# Patient Record
Sex: MALE | Race: WHITE | NOT HISPANIC OR LATINO | URBAN - METROPOLITAN AREA
[De-identification: names, ages, dates, MRNs, and addresses within clinical notes are randomized per-mention and may not be internally consistent; named-entity substitution may affect disease eponyms.]

---

## 2020-03-24 ENCOUNTER — EMERGENCY (EMERGENCY)
Facility: HOSPITAL | Age: 65
LOS: 1 days | Discharge: HOME | End: 2020-03-24
Attending: EMERGENCY MEDICINE
Payer: SELF-PAY

## 2020-03-24 VITALS
SYSTOLIC BLOOD PRESSURE: 140 MMHG | DIASTOLIC BLOOD PRESSURE: 92 MMHG | HEART RATE: 85 BPM | OXYGEN SATURATION: 95 % | HEIGHT: 71 IN | TEMPERATURE: 99 F | WEIGHT: 218.04 LBS | RESPIRATION RATE: 20 BRPM

## 2020-03-24 PROCEDURE — 99284 EMERGENCY DEPT VISIT MOD MDM: CPT | Mod: 25

## 2020-03-24 PROCEDURE — 99284 EMERGENCY DEPT VISIT MOD MDM: CPT

## 2020-03-24 PROCEDURE — 71046 X-RAY EXAM CHEST 2 VIEWS: CPT | Mod: 26

## 2020-03-24 PROCEDURE — 70360 X-RAY EXAM OF NECK: CPT | Mod: 26

## 2020-03-24 PROCEDURE — 71046 X-RAY EXAM CHEST 2 VIEWS: CPT

## 2020-03-24 PROCEDURE — 70360 X-RAY EXAM OF NECK: CPT

## 2020-03-24 NOTE — ED ADULT NURSE NOTE - OBJECTIVE STATEMENT
Pt s/p swallowed entire toothbrush trying to get piece of meat stuck in throat x 1 hr ago. No acute distress noted, denies chest pain, no shortness of breath indicated. Safety maintained. Pt s/p swallowed entire toothbrush trying to get piece of meat stuck in throat x 1 hr ago. Pt denies suicidal /homicidal ideations. No acute distress noted, denies chest pain, no shortness of breath indicated. Safety maintained.

## 2020-03-24 NOTE — ED PROVIDER NOTE - PROGRESS NOTE DETAILS
foreign body visualized on xray.  no endocipy suite available here at [facility] as it was converted for COVID management.  Transfer center contacted.  Spoke with GI fellow Emma.  [facility] also does not have endoscopy suite for same reason.  Since procedure can not be done till AM at either facility, GI here at [facility], Dr. Brantley, comfortable to do procedure in AM.  patient does not want to wait overnight and says he will follow up as outpatient.  pt speaking full sentences, tolerating secretions, no signs of impaction.  Will d/c

## 2020-03-24 NOTE — ED ADULT NURSE NOTE - JUGULAR VENOUS DISTENTION
Returned call to home care nurse to give verbal orders per protocol as requested. Nurse verbalized understanding.    Shaniqua Blair RN      
[facility] phone call message - order or referral request for patient:     Order or referral being requested: Requesting Updated Order  Nursing/ nursing visit  Changed from early part of the week to middle of the week. This is due to a conflict with [employer] holiday.      Additional Comments: none    OK to leave a message on voice mail? Yes    Primary language: Central African      needed? Yes    Call taken on August 20, 2018 at 2:00 PM by Diane Gil      
absent

## 2020-03-24 NOTE — ED ADULT NURSE NOTE - ED STAT RN HANDOFF DETAILS
Report given to KEO Sequeira. Pt sitting in chair, no acute distress noted, denies chest pain noted, no shortness of breath indicated. Safety maintained

## 2020-03-24 NOTE — ED PROVIDER NOTE - OBJECTIVE STATEMENT
63 y/o M with a significant PMHx of HTN presents to the ER c/o foreign body in throat. Patient states he was eating, and felt something stuck in his throat. Patient states he used his toothbrush to dislodge what was stuck. Patient states he swallowed the toothbrush and notes that he feels it is stuck in between his chest and throat. Patient denies any other complaints.

## 2020-03-24 NOTE — ED PROVIDER NOTE - PATIENT PORTAL LINK FT
You can access the FollowMyHealth Patient Portal offered by [facility] by registering at the following website: [email or web address]. By joining [facility]’s FollowMyHealth portal, you will also be able to view your health information using other applications (apps) compatible with our system.

## 2020-03-25 VITALS
SYSTOLIC BLOOD PRESSURE: 143 MMHG | RESPIRATION RATE: 18 BRPM | DIASTOLIC BLOOD PRESSURE: 80 MMHG | OXYGEN SATURATION: 97 % | TEMPERATURE: 98 F | HEART RATE: 89 BPM

## 2021-09-29 ENCOUNTER — EMERGENCY (EMERGENCY)
Facility: HOSPITAL | Age: 66
LOS: 1 days | Discharge: HOME | End: 2021-09-29
Attending: STUDENT IN AN ORGANIZED HEALTH CARE EDUCATION/TRAINING PROGRAM
Payer: COMMERCIAL

## 2021-09-29 VITALS
DIASTOLIC BLOOD PRESSURE: 83 MMHG | RESPIRATION RATE: 17 BRPM | SYSTOLIC BLOOD PRESSURE: 125 MMHG | HEART RATE: 65 BPM | HEIGHT: 71 IN | OXYGEN SATURATION: 97 % | TEMPERATURE: 97 F | WEIGHT: 184.97 LBS

## 2021-09-29 PROCEDURE — 99285 EMERGENCY DEPT VISIT HI MDM: CPT

## 2021-09-29 RX ORDER — TETANUS TOXOID, REDUCED DIPHTHERIA TOXOID AND ACELLULAR PERTUSSIS VACCINE, ADSORBED 5; 2.5; 8; 8; 2.5 [IU]/.5ML; [IU]/.5ML; UG/.5ML; UG/.5ML; UG/.5ML
0.5 SUSPENSION INTRAMUSCULAR ONCE
Refills: 0 | Status: COMPLETED | OUTPATIENT
Start: 2021-09-29 | End: 2021-09-29

## 2021-09-29 RX ADMIN — TETANUS TOXOID, REDUCED DIPHTHERIA TOXOID AND ACELLULAR PERTUSSIS VACCINE, ADSORBED 0.5 MILLILITER(S): 5; 2.5; 8; 8; 2.5 SUSPENSION INTRAMUSCULAR at 23:31

## 2021-09-29 NOTE — ED PROVIDER NOTE - NSFOLLOWUPCLINICS_GEN_ALL_ED_FT
[facility]  Cardiology  [address] [place] [id number]  Phone: [phone]  Fax:   Follow Up Time: Urgent

## 2021-09-29 NOTE — ED PROVIDER NOTE - PHYSICAL EXAMINATION
Vital Signs Reviewed  GEN: Comfortable, Etoh odor, NAD  HEENT: NCAT, No cspine TTP, MMM, Neck Supple  RESP: CTAB, No rales/rhonchi/wheezing  CV: RRR, S1S2, No murmurs  ABD: No TTP, Soft, ND, No masses, No CVA Tenderness  Extrem/Skin: Small superficial abrasion to R forearm with minimal swelling and no lac/bony TTP/deformity, No other signs of trauma, Equal pulses bilat, No cyanosis/edema/rashes  Neuro: No focal deficits

## 2021-09-29 NOTE — ED PROVIDER NOTE - CLINICAL SUMMARY MEDICAL DECISION MAKING FREE TEXT BOX
Pt p/w AMS stating that he was drinking etoh and only wants to sleep. Reassuring exam with only small R forearm abrasion and no other signs of trauma. Updating tetanus and will observe for clinical sobriety. Pt stable. Will reassess. Pt p/w AMS stating that he was drinking etoh and only wants to sleep. Reassuring exam with only small R forearm abrasion and no other signs of trauma. Updating tetanus and will observe for clinical sobriety. Pt stable. Will reassess.    See progress note.    Pt demanding immediate d/c despite atrial fibrillation and very low K. Pt is fully oriented and conversational with steady gait, will not deny his decisional capacity for these reasons. Rec cardiology and PMD f/u ASAP. Discussed with pt my clinical impression and results, patient given strict return precautions if persistent or worsening of symptoms occurs, and need for close follow up. Pt expressed understanding and agrees with plan. Pt is well appearing with a reassuring exam. Discharge home with PMD or Specialist f/u within 3 days.

## 2021-09-29 NOTE — ED PROVIDER NOTE - PATIENT PORTAL LINK FT
You can access the FollowMyHealth Patient Portal offered by [facility] by registering at the following website: [email or web address]. By joining [facility]’s FollowMyHealth portal, you will also be able to view your health information using other applications (apps) compatible with our system.

## 2021-09-29 NOTE — ED PROVIDER NOTE - NSFOLLOWUPINSTRUCTIONS_ED_ALL_ED_FT
You were seen in the emergency room today for alcohol intoxication. While in the ER you were found to have Atrial Fibrillation and should obtain the next available appointment with the Cardiology doctors. Please call your primary doctor to inform them of this ER visit and obtain the next available appointment within the next 5 days. As we discussed, return to the ER if you have any worsening symptoms.    We no longer feel that you need further emergency care or admission to the hospital at this time.    While we have determined that you are currently stable for discharge, we know that things can change. Please seek immediate medical attention or return to the ER if you experience any of the following:  Any worsening or persistent symptoms  Severe Pain  Chest Pain  Difficulty Breathing  Bleeding  Passing Out  Severe Rash  Inability to Eat or Drink  Persistent Fever    Please see a primary care doctor or specialist within 5 days to ensure that you are improving.    Please call the [facility] phone numbers on this document if you have any problems obtaining a follow up appointment.    I wish you well! -Dr Lujan

## 2021-09-29 NOTE — ED PROVIDER NOTE - PROGRESS NOTE DETAILS
Pt noted to be tachycardic, EKG obtained showing atrial fib with RVR. Pt given diltiazem for rate control as hx is unknown. Rate control achieved with HR in 110s. On reassessment pt states that he would like to leave immediately. D/w pt the new diagnosis of afib however pt states that he does not want to stay in a hospital any longer and requesting d/c immediately. Pt is ambulating with a steady gait with clear speech, cannot deny pt's decisional capacity at this time. See dispo note.

## 2021-09-29 NOTE — ED PROVIDER NOTE - OBJECTIVE STATEMENT
65 yo M h/o HTN p/w etoh intox. Pt found on street by EMS after unknown person called 911. Pt states that he was drinking etoh tonight and "wants to sleep." Pt denies any trauma/injuries, drugs today, seizure, chest pain, SOB, other recent illness or hospitalizations.

## 2021-09-30 VITALS — DIASTOLIC BLOOD PRESSURE: 80 MMHG | HEART RATE: 113 BPM | SYSTOLIC BLOOD PRESSURE: 132 MMHG

## 2021-09-30 PROBLEM — I10 ESSENTIAL (PRIMARY) HYPERTENSION: Chronic | Status: ACTIVE | Noted: 2020-03-25

## 2021-09-30 LAB
ALBUMIN SERPL ELPH-MCNC: 3.4 G/DL — LOW (ref 3.5–5)
ALP SERPL-CCNC: 80 U/L — SIGNIFICANT CHANGE UP (ref 40–120)
ALT FLD-CCNC: 113 U/L DA — HIGH (ref 10–60)
ANION GAP SERPL CALC-SCNC: 13 MMOL/L — SIGNIFICANT CHANGE UP (ref 5–17)
ANISOCYTOSIS BLD QL: SLIGHT — SIGNIFICANT CHANGE UP
AST SERPL-CCNC: 186 U/L — HIGH (ref 10–40)
BASOPHILS # BLD AUTO: 0.03 K/UL — SIGNIFICANT CHANGE UP (ref 0–0.2)
BASOPHILS NFR BLD AUTO: 0.5 % — SIGNIFICANT CHANGE UP (ref 0–2)
BILIRUB SERPL-MCNC: 0.8 MG/DL — SIGNIFICANT CHANGE UP (ref 0.2–1.2)
BUN SERPL-MCNC: 22 MG/DL — HIGH (ref 7–18)
CALCIUM SERPL-MCNC: 8.6 MG/DL — SIGNIFICANT CHANGE UP (ref 8.4–10.5)
CHLORIDE SERPL-SCNC: 88 MMOL/L — LOW (ref 96–108)
CO2 SERPL-SCNC: 35 MMOL/L — HIGH (ref 22–31)
CREAT SERPL-MCNC: 1.59 MG/DL — HIGH (ref 0.5–1.3)
EOSINOPHIL # BLD AUTO: 0.01 K/UL — SIGNIFICANT CHANGE UP (ref 0–0.5)
EOSINOPHIL NFR BLD AUTO: 0.2 % — SIGNIFICANT CHANGE UP (ref 0–6)
ETHANOL SERPL-MCNC: 203 MG/DL — HIGH (ref 0–10)
GLUCOSE SERPL-MCNC: 107 MG/DL — HIGH (ref 70–99)
HCT VFR BLD CALC: 46 % — SIGNIFICANT CHANGE UP (ref 39–50)
HGB BLD-MCNC: 14.9 G/DL — SIGNIFICANT CHANGE UP (ref 13–17)
IMM GRANULOCYTES NFR BLD AUTO: 0.5 % — SIGNIFICANT CHANGE UP (ref 0–1.5)
LYMPHOCYTES # BLD AUTO: 1.27 K/UL — SIGNIFICANT CHANGE UP (ref 1–3.3)
LYMPHOCYTES # BLD AUTO: 19.8 % — SIGNIFICANT CHANGE UP (ref 13–44)
MACROCYTES BLD QL: SLIGHT — SIGNIFICANT CHANGE UP
MANUAL SMEAR VERIFICATION: SIGNIFICANT CHANGE UP
MCHC RBC-ENTMCNC: 26.5 PG — LOW (ref 27–34)
MCHC RBC-ENTMCNC: 32.4 GM/DL — SIGNIFICANT CHANGE UP (ref 32–36)
MCV RBC AUTO: 81.9 FL — SIGNIFICANT CHANGE UP (ref 80–100)
MONOCYTES # BLD AUTO: 0.83 K/UL — SIGNIFICANT CHANGE UP (ref 0–0.9)
MONOCYTES NFR BLD AUTO: 13 % — SIGNIFICANT CHANGE UP (ref 2–14)
NEUTROPHILS # BLD AUTO: 4.23 K/UL — SIGNIFICANT CHANGE UP (ref 1.8–7.4)
NEUTROPHILS NFR BLD AUTO: 66 % — SIGNIFICANT CHANGE UP (ref 43–77)
NRBC # BLD: 0 /100 WBCS — SIGNIFICANT CHANGE UP (ref 0–0)
PLAT MORPH BLD: NORMAL — SIGNIFICANT CHANGE UP
PLATELET # BLD AUTO: 213 K/UL — SIGNIFICANT CHANGE UP (ref 150–400)
POLYCHROMASIA BLD QL SMEAR: SLIGHT — SIGNIFICANT CHANGE UP
POTASSIUM SERPL-MCNC: 2.7 MMOL/L — CRITICAL LOW (ref 3.5–5.3)
POTASSIUM SERPL-SCNC: 2.7 MMOL/L — CRITICAL LOW (ref 3.5–5.3)
PROT SERPL-MCNC: 7 G/DL — SIGNIFICANT CHANGE UP (ref 6–8.3)
RBC # BLD: 5.62 M/UL — SIGNIFICANT CHANGE UP (ref 4.2–5.8)
RBC # FLD: 21.4 % — HIGH (ref 10.3–14.5)
RBC BLD AUTO: ABNORMAL
SODIUM SERPL-SCNC: 136 MMOL/L — SIGNIFICANT CHANGE UP (ref 135–145)
TARGETS BLD QL SMEAR: SLIGHT — SIGNIFICANT CHANGE UP
TROPONIN I SERPL-MCNC: 0.04 NG/ML — SIGNIFICANT CHANGE UP (ref 0–0.04)
WBC # BLD: 6.4 K/UL — SIGNIFICANT CHANGE UP (ref 3.8–10.5)
WBC # FLD AUTO: 6.4 K/UL — SIGNIFICANT CHANGE UP (ref 3.8–10.5)

## 2021-09-30 PROCEDURE — 90715 TDAP VACCINE 7 YRS/> IM: CPT

## 2021-09-30 PROCEDURE — 96374 THER/PROPH/DIAG INJ IV PUSH: CPT

## 2021-09-30 PROCEDURE — 80307 DRUG TEST PRSMV CHEM ANLYZR: CPT

## 2021-09-30 PROCEDURE — 93005 ELECTROCARDIOGRAM TRACING: CPT

## 2021-09-30 PROCEDURE — 93010 ELECTROCARDIOGRAM REPORT: CPT

## 2021-09-30 PROCEDURE — 99285 EMERGENCY DEPT VISIT HI MDM: CPT | Mod: 25

## 2021-09-30 PROCEDURE — 85025 COMPLETE CBC W/AUTO DIFF WBC: CPT

## 2021-09-30 PROCEDURE — 84484 ASSAY OF TROPONIN QUANT: CPT

## 2021-09-30 PROCEDURE — 90471 IMMUNIZATION ADMIN: CPT

## 2021-09-30 PROCEDURE — 36415 COLL VENOUS BLD VENIPUNCTURE: CPT

## 2021-09-30 PROCEDURE — 80053 COMPREHEN METABOLIC PANEL: CPT

## 2021-09-30 PROCEDURE — 82962 GLUCOSE BLOOD TEST: CPT

## 2021-09-30 RX ORDER — SODIUM CHLORIDE 9 MG/ML
2000 INJECTION INTRAMUSCULAR; INTRAVENOUS; SUBCUTANEOUS ONCE
Refills: 0 | Status: COMPLETED | OUTPATIENT
Start: 2021-09-30 | End: 2021-09-30

## 2021-09-30 RX ORDER — DILTIAZEM HCL 120 MG
15 CAPSULE, EXT RELEASE 24 HR ORAL ONCE
Refills: 0 | Status: DISCONTINUED | OUTPATIENT
Start: 2021-09-30 | End: 2021-09-30

## 2021-09-30 RX ORDER — POTASSIUM CHLORIDE 20 MEQ
10 PACKET (EA) ORAL
Refills: 0 | Status: COMPLETED | OUTPATIENT
Start: 2021-09-30 | End: 2021-09-30

## 2021-09-30 RX ORDER — SODIUM CHLORIDE 9 MG/ML
3 INJECTION INTRAMUSCULAR; INTRAVENOUS; SUBCUTANEOUS EVERY 8 HOURS
Refills: 0 | Status: DISCONTINUED | OUTPATIENT
Start: 2021-09-30 | End: 2021-10-03

## 2021-09-30 RX ORDER — POTASSIUM CHLORIDE 20 MEQ
40 PACKET (EA) ORAL ONCE
Refills: 0 | Status: COMPLETED | OUTPATIENT
Start: 2021-09-30 | End: 2021-09-30

## 2021-09-30 RX ORDER — DILTIAZEM HCL 120 MG
20 CAPSULE, EXT RELEASE 24 HR ORAL ONCE
Refills: 0 | Status: COMPLETED | OUTPATIENT
Start: 2021-09-30 | End: 2021-09-30

## 2021-09-30 RX ORDER — DILTIAZEM HCL 120 MG
60 CAPSULE, EXT RELEASE 24 HR ORAL ONCE
Refills: 0 | Status: COMPLETED | OUTPATIENT
Start: 2021-09-30 | End: 2021-09-30

## 2021-09-30 RX ADMIN — Medication 60 MILLIGRAM(S): at 05:06

## 2021-09-30 RX ADMIN — Medication 100 MILLIEQUIVALENT(S): at 05:05

## 2021-09-30 RX ADMIN — Medication 20 MILLIGRAM(S): at 05:14

## 2021-09-30 RX ADMIN — SODIUM CHLORIDE 2000 MILLILITER(S): 9 INJECTION INTRAMUSCULAR; INTRAVENOUS; SUBCUTANEOUS at 04:12

## 2021-09-30 RX ADMIN — Medication 100 MILLIEQUIVALENT(S): at 06:07

## 2021-09-30 RX ADMIN — Medication 40 MILLIEQUIVALENT(S): at 05:05

## 2022-05-20 ENCOUNTER — EMERGENCY (EMERGENCY)
Facility: HOSPITAL | Age: 67
LOS: 1 days | Discharge: HOME | End: 2022-05-20
Attending: STUDENT IN AN ORGANIZED HEALTH CARE EDUCATION/TRAINING PROGRAM
Payer: COMMERCIAL

## 2022-05-20 VITALS
SYSTOLIC BLOOD PRESSURE: 148 MMHG | OXYGEN SATURATION: 96 % | TEMPERATURE: 99 F | DIASTOLIC BLOOD PRESSURE: 95 MMHG | HEART RATE: 75 BPM | HEIGHT: 71 IN | WEIGHT: 210.1 LBS | RESPIRATION RATE: 20 BRPM

## 2022-05-20 LAB
AMPHET UR-MCNC: NEGATIVE — SIGNIFICANT CHANGE UP
ANION GAP SERPL CALC-SCNC: 14 MMOL/L — SIGNIFICANT CHANGE UP (ref 5–17)
APAP SERPL-MCNC: <3 UG/ML — LOW (ref 10–26)
APPEARANCE UR: CLEAR — SIGNIFICANT CHANGE UP
BARBITURATES UR SCN-MCNC: NEGATIVE — SIGNIFICANT CHANGE UP
BASOPHILS # BLD AUTO: 0.03 K/UL — SIGNIFICANT CHANGE UP (ref 0–0.2)
BASOPHILS NFR BLD AUTO: 0.5 % — SIGNIFICANT CHANGE UP (ref 0–2)
BENZODIAZ UR-MCNC: NEGATIVE — SIGNIFICANT CHANGE UP
BILIRUB UR-MCNC: NEGATIVE — SIGNIFICANT CHANGE UP
BUN SERPL-MCNC: 23.5 MG/DL — HIGH (ref 8–20)
CALCIUM SERPL-MCNC: 9 MG/DL — SIGNIFICANT CHANGE UP (ref 8.6–10.2)
CHLORIDE SERPL-SCNC: 100 MMOL/L — SIGNIFICANT CHANGE UP (ref 98–107)
CO2 SERPL-SCNC: 25 MMOL/L — SIGNIFICANT CHANGE UP (ref 22–29)
COCAINE METAB.OTHER UR-MCNC: NEGATIVE — SIGNIFICANT CHANGE UP
COLOR SPEC: YELLOW — SIGNIFICANT CHANGE UP
CREAT SERPL-MCNC: 1.12 MG/DL — SIGNIFICANT CHANGE UP (ref 0.5–1.3)
DIFF PNL FLD: NEGATIVE — SIGNIFICANT CHANGE UP
EGFR: 72 ML/MIN/1.73M2 — SIGNIFICANT CHANGE UP
EOSINOPHIL # BLD AUTO: 0.12 K/UL — SIGNIFICANT CHANGE UP (ref 0–0.5)
EOSINOPHIL NFR BLD AUTO: 2.2 % — SIGNIFICANT CHANGE UP (ref 0–6)
ETHANOL SERPL-MCNC: <10 MG/DL — SIGNIFICANT CHANGE UP (ref 0–9)
GLUCOSE SERPL-MCNC: 100 MG/DL — HIGH (ref 70–99)
GLUCOSE UR QL: NEGATIVE MG/DL — SIGNIFICANT CHANGE UP
HCT VFR BLD CALC: 39.6 % — SIGNIFICANT CHANGE UP (ref 39–50)
HGB BLD-MCNC: 13 G/DL — SIGNIFICANT CHANGE UP (ref 13–17)
IMM GRANULOCYTES NFR BLD AUTO: 0.4 % — SIGNIFICANT CHANGE UP (ref 0–1.5)
KETONES UR-MCNC: NEGATIVE — SIGNIFICANT CHANGE UP
LEUKOCYTE ESTERASE UR-ACNC: NEGATIVE — SIGNIFICANT CHANGE UP
LYMPHOCYTES # BLD AUTO: 0.89 K/UL — LOW (ref 1–3.3)
LYMPHOCYTES # BLD AUTO: 16.3 % — SIGNIFICANT CHANGE UP (ref 13–44)
MCHC RBC-ENTMCNC: 29.6 PG — SIGNIFICANT CHANGE UP (ref 27–34)
MCHC RBC-ENTMCNC: 32.8 GM/DL — SIGNIFICANT CHANGE UP (ref 32–36)
MCV RBC AUTO: 90.2 FL — SIGNIFICANT CHANGE UP (ref 80–100)
METHADONE UR-MCNC: NEGATIVE — SIGNIFICANT CHANGE UP
MONOCYTES # BLD AUTO: 0.56 K/UL — SIGNIFICANT CHANGE UP (ref 0–0.9)
MONOCYTES NFR BLD AUTO: 10.2 % — SIGNIFICANT CHANGE UP (ref 2–14)
NEUTROPHILS # BLD AUTO: 3.85 K/UL — SIGNIFICANT CHANGE UP (ref 1.8–7.4)
NEUTROPHILS NFR BLD AUTO: 70.4 % — SIGNIFICANT CHANGE UP (ref 43–77)
NITRITE UR-MCNC: NEGATIVE — SIGNIFICANT CHANGE UP
OPIATES UR-MCNC: NEGATIVE — SIGNIFICANT CHANGE UP
PCP SPEC-MCNC: SIGNIFICANT CHANGE UP
PCP UR-MCNC: NEGATIVE — SIGNIFICANT CHANGE UP
PH UR: 7 — SIGNIFICANT CHANGE UP (ref 5–8)
PLATELET # BLD AUTO: 171 K/UL — SIGNIFICANT CHANGE UP (ref 150–400)
POTASSIUM SERPL-MCNC: 4.1 MMOL/L — SIGNIFICANT CHANGE UP (ref 3.5–5.3)
POTASSIUM SERPL-SCNC: 4.1 MMOL/L — SIGNIFICANT CHANGE UP (ref 3.5–5.3)
PROT UR-MCNC: NEGATIVE — SIGNIFICANT CHANGE UP
RBC # BLD: 4.39 M/UL — SIGNIFICANT CHANGE UP (ref 4.2–5.8)
RBC # FLD: 14.5 % — SIGNIFICANT CHANGE UP (ref 10.3–14.5)
SALICYLATES SERPL-MCNC: <0.6 MG/DL — LOW (ref 10–20)
SARS-COV-2 RNA SPEC QL NAA+PROBE: SIGNIFICANT CHANGE UP
SODIUM SERPL-SCNC: 139 MMOL/L — SIGNIFICANT CHANGE UP (ref 135–145)
SP GR SPEC: 1.01 — SIGNIFICANT CHANGE UP (ref 1.01–1.02)
THC UR QL: NEGATIVE — SIGNIFICANT CHANGE UP
TSH SERPL-MCNC: 2.71 UIU/ML — SIGNIFICANT CHANGE UP (ref 0.27–4.2)
UROBILINOGEN FLD QL: NEGATIVE MG/DL — SIGNIFICANT CHANGE UP
WBC # BLD: 5.47 K/UL — SIGNIFICANT CHANGE UP (ref 3.8–10.5)
WBC # FLD AUTO: 5.47 K/UL — SIGNIFICANT CHANGE UP (ref 3.8–10.5)

## 2022-05-20 PROCEDURE — 99285 EMERGENCY DEPT VISIT HI MDM: CPT

## 2022-05-20 PROCEDURE — 70450 CT HEAD/BRAIN W/O DYE: CPT | Mod: 26,MD

## 2022-05-20 PROCEDURE — 93010 ELECTROCARDIOGRAM REPORT: CPT

## 2022-05-20 NOTE — ED PROVIDER NOTE - CLINICAL SUMMARY MEDICAL DECISION MAKING FREE TEXT BOX
From [facility] for alcohol, presents with hallucinations, abnormal from usual. Will send labs, CT head, psych evaluate.

## 2022-05-20 NOTE — ED ADULT TRIAGE NOTE - CHIEF COMPLAINT QUOTE
presenting from [facility] where staff states he is hallucinating, seeing and hearing people who are not there. in triage patient is A&Ox4, calm and cooperative, denies any SI/HI

## 2022-05-20 NOTE — ED PROVIDER NOTE - OBJECTIVE STATEMENT
66 y/o male with a PMHx of alcohol abuse, depression, on Ativan, presents from [facility] for alcohol c/o hallucinations today. [facility] employee states he has been seeing people in his room. Pt has been at [facility] before, employees are familiar with him and state he is acting abnormally. Last alcohol use a few days ago. Denies SI/HI. Pt states his daughter recently committed suicide, and her birthday is coming up, so at times he feels depressed. Denies chest pain, SOB, abdominal pain, or n/v/d. Denies any other complaints. 68 y/o male with a PMHx of depression, on Ativan, presents from [facility] for alcohol c/o hallucinations today. [facility] employee states he has been hallucinating people in his room. Pt has been at [facility] before, employees are familiar with him and state he is acting abnormally. Last alcohol use a few days ago. Denies SI/HI. Pt states his daughter recently committed suicide and her birthday is coming up, so at times he feels depressed. Denies chest pain, SOB, abdominal pain, or n/v/d. Denies any other complaints. 66 y/o male with a PMHx of depression, on Ativan, presents from [facility] for alcohol c/o hallucinations today. [facility] employee states he has been hallucinating people in his room. Pt has been at [facility] before, employees are familiar with him and state he is acting abnormally. Reports instances where he is trying to drink water with the cap still on. Last alcohol use a few days ago. Pt states his daughter recently committed suicide and her birthday is coming up, so at times he feels depressed. Denies SI/HI. Denies chest pain, SOB, abdominal pain, or n/v/d. Denies any other complaints.

## 2022-05-20 NOTE — ED PROVIDER NOTE - PROGRESS NOTE DETAILS
pending [facility] for psych eval seen by psych. cleared for dc. hallucinations thought to be due to alcohol withdrawal. [facility]  no tachycardia or signs of tremor. center to set up transport to return

## 2022-05-21 VITALS
RESPIRATION RATE: 20 BRPM | OXYGEN SATURATION: 98 % | HEART RATE: 68 BPM | TEMPERATURE: 98 F | DIASTOLIC BLOOD PRESSURE: 91 MMHG | SYSTOLIC BLOOD PRESSURE: 138 MMHG

## 2022-05-21 DIAGNOSIS — F43.21 ADJUSTMENT DISORDER WITH DEPRESSED MOOD: ICD-10-CM

## 2022-05-21 DIAGNOSIS — F10.20 ALCOHOL DEPENDENCE, UNCOMPLICATED: ICD-10-CM

## 2022-05-21 PROCEDURE — U0005: CPT

## 2022-05-21 PROCEDURE — 80048 BASIC METABOLIC PNL TOTAL CA: CPT

## 2022-05-21 PROCEDURE — 84443 ASSAY THYROID STIM HORMONE: CPT

## 2022-05-21 PROCEDURE — 99285 EMERGENCY DEPT VISIT HI MDM: CPT | Mod: 25

## 2022-05-21 PROCEDURE — 85025 COMPLETE CBC W/AUTO DIFF WBC: CPT

## 2022-05-21 PROCEDURE — 70450 CT HEAD/BRAIN W/O DYE: CPT | Mod: MD

## 2022-05-21 PROCEDURE — 36415 COLL VENOUS BLD VENIPUNCTURE: CPT

## 2022-05-21 PROCEDURE — 80307 DRUG TEST PRSMV CHEM ANLYZR: CPT

## 2022-05-21 PROCEDURE — U0003: CPT

## 2022-05-21 PROCEDURE — 81003 URINALYSIS AUTO W/O SCOPE: CPT

## 2022-05-21 PROCEDURE — 93005 ELECTROCARDIOGRAM TRACING: CPT

## 2022-05-21 RX ADMIN — Medication 1 MILLIGRAM(S): at 03:41

## 2022-05-21 NOTE — ED BEHAVIORAL HEALTH ASSESSMENT NOTE - SUMMARY
67y/oM, , domiciled w/ wife, works as  w/ PMH of Afib, GERD and PPhx of ETOH use Do BIBems activated by substance facility for concern for AH. On interview and exam, patient denies auditory or visual hallucinations and does not appear to be internally stimulated. Given recent ETOH use, presentation is most consistent with alcohol withdrawal vs. delirium vs. alcoholic hallucinosis given collateral from [facility] that patient endorsed AH. There is some concern, per treatment center, that at baseline patient seems confused at times, however it is difficult to assess and appreciate discrete neurocognitive disorder given acute concerns for withdrawal. At this time, patient does not require inpatient psychiatric admission as he denies any imminent safety concerns. Patient will benefit from establishing with addiction psychiatry as outpatient.

## 2022-05-21 NOTE — ED BEHAVIORAL HEALTH ASSESSMENT NOTE - CURRENT MEDICATION
Trazadone 200mg QHS, Seroquel 200mg QHS PRN, Buspar (dose unknown), propranolol (dose unknown), cymbalta 60mg BID

## 2022-05-21 NOTE — ED BEHAVIORAL HEALTH ASSESSMENT NOTE - DETAILS
Discussed with [facility] denies as above daughter and paternal uncle both completed suicide Topical Sulfur Applications Pregnancy And Lactation Text: This medication is Pregnancy Category C and has an unknown safety profile during pregnancy. It is unknown if this topical medication is excreted in breast milk. Not at acute or chronic risk for SA

## 2022-05-21 NOTE — ED BEHAVIORAL HEALTH ASSESSMENT NOTE - RISK ASSESSMENT
Low Acute Suicide Risk Risk factors for suicide attempt include substance use and fhx of completed suicide.   Protective factors include responsibility to family

## 2022-05-21 NOTE — ED BEHAVIORAL HEALTH NOTE - BEHAVIORAL HEALTH NOTE
Consult requested by EM Attending Dr. Walsh at 0:31. Telepsychiatry attempted to consult at 1:30 but unable to initiate due to delay in patient being set up on the telepsychiatry monitor. ED staff aware.

## 2022-05-21 NOTE — ED BEHAVIORAL HEALTH NOTE - BEHAVIORAL HEALTH NOTE
==================  PRE-HOSPITAL COURSE  ===================  SOURCE:  RN and secondhand ED documentation.  DETAILS:  Per chart, patient was BIB EMS activated by staff at [facility], reporting having non-command AH and VH.     ============  ED COURSE  =============  SOURCE:  RN and secondhand ED documentation.  ARRIVAL:  Per chart and RN, patient arrived via EMS, no triage issues noted.  BEHAVIOR: RN described patient to be currently calm and cooperative, presenting with disorganized thought process, not fully oriented, appears to be confused, presenting with stable mood and appropriate affect, remains in good behavioral and impulse control, is not currently violent/aggressive. RN stated that the pt was walking around and required constant verbal re-direction. RN stated that the patient is denying current SI/HI, report AVH that are non-command, unclear on what the content of the hallucinations are. RN stated that there are no visible marks, bruises, or lacerations on the body. RN stated that the patient appears to be fairly-groomed.  TREATMENT:  Per chart and RN, patient did not require PRN medications.   VISITORS:  None     ========================  FOR EACH COLLATERAL   ========================  NAME: Kerri Shirley RN   NUMBER: [phone]  RELATIONSHIP: Overnight Nurse Supervisor  RELIABILITY: Limited  Collateral has requested that the information provided remain confidential: Yes [  ] No [ x ]  Collateral has provided information that patient is/may be unaware of: Yes [  ] No [ x ]     Patient is a 67M currently domiciled with wife, employed in real estate, PPHx/o EtOH use, no no reported PMHx, no past IPP admissions, past rehab admissions, no legal hx, no access to firearms. Patient was BIB EMS activated by staff at detox clinic due to pt presenting with     COVID Exposure Screen- collateral (i.e. third-party, chart review, belongings, etc; include EMS and ED staff)  1.        *Has the patient had a COVID-19 test in the last 90 days?  (  ) Yes   ( x ) No   (  ) Unknown- Reason: _____  IF YES PROCEED TO QUESTION #2. IF NO OR UNKNOWN, PLEASE SKIP TO QUESTION #3.  2.        Date of test(s) and result(s): ________  3.        *Has the patient tested positive for COVID-19 antibodies? (  ) Yes   ( x ) No   (  ) Unknown- Reason: _____  IF YES PRO  CEED TO QUESTION #4. IF NO or UNKNOWN, PLEASE SKIP TO QUESTION #5.  4.        Date of positive antibody test: ________  5.        *Has the patient received 2 doses of the COVID-19 vaccine? ( x ) Yes   (  ) No   (  ) Unknown- Reason: _____  IF YES PROCEED TO QUESTION #6. IF NO or UNKNOWN, PLEASE SKIP TO QUESTION #7.  6.         Date of second dose: Collateral unable to recall  7.        *In the past 10 days, has the patient been around anyone with a positive COVID-19 test?* (  ) Yes   ( x ) No   (  ) Unknown- Reason: __  IF YES PROCEED TO QUESTION #8. IF NO or UNKNOWN, PLEASE SKIP TO QUESTION #13.  8.        Was the patient within 6 feet of them for at least 15 minutes? (  ) Yes   (  ) No   (  ) Unknown- Reason: ____  9.        Did the patient provide care for them? (  ) Yes   (  ) No   (  ) Unknown- Reason: ______  10.     Did the patient have direct physical contact with them (touched, hugged, or kissed them)? (  ) Yes   (  ) No	(  ) Unknown- Reason: __  11.     Did the patient share eating or drinking utensils with them? (  ) Yes   (  ) No	(  ) Unknown- Reason: ____  12.     Did they sneeze, cough, or somehow get respiratory droplets on the patient? (  ) Yes   (  ) No	(  ) Unknown- Reason: ______  13.     *Has the patient been out of [place] within the past 10 days?* (  ) Yes   ( x ) No   (  ) Unknown- Reason: _____  IF YES PLEASE ANSWER THE FOLLOWING QUESTIONS:  14.     Which state/country did they go to? ______  15.     Were they there over 24 hours? (  ) Yes   (  ) No	(  ) Unknown- Reason: ______ ==================  PRE-HOSPITAL COURSE  ===================  SOURCE:  RN and secondhand ED documentation.  DETAILS:  Per chart, patient was BIB EMS activated by staff at [facility], reporting having non-command AH and VH.     ============  ED COURSE  =============  SOURCE:  RN and secondhand ED documentation.  ARRIVAL:  Per chart and RN, patient arrived via EMS, no triage issues noted.  BEHAVIOR: RN described patient to be currently calm and cooperative, presenting with disorganized thought process, not fully oriented, appears to be confused, presenting with stable mood and blank affect, remains in good behavioral and impulse control, is not currently violent/aggressive. RN stated that the pt was walking around and required constant verbal re-direction. When asked to change into a hospital gown, pt came out with the gown wrapped around his shoulders, with his personal clothing still on. RN stated that the patient is denying current SI/HI, report AVH that are non-command, unclear on what the content of the hallucinations are. RN stated that there are no visible marks, bruises, or lacerations on the body. RN stated that the patient appears to be fairly-groomed.  TREATMENT:  Per chart and RN, patient did not require PRN medications.   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 Per collateral, Patient was BIB EMS activated by staff at detox clinic due to pt presenting with hallucinations for the first time, collateral stated that this was the first that staff had observed pt to hallucinate. Collateral stated that the pt reported hearing non-command AH and reports seeing visions, unclear on the content of the hallucinations. Per collateral, pt is confused at baseline, remains calm and in good behavioral control, is a poor historian, is mainly oriented to time, place, and self, however at times has difficulty recalling familiar faces. Collateral denies knowledge of past SA/self-injury, denies past aggression/violence. Pt currently has standing Rx for Trazodone 50MG QD and Vistiril 50MG QD. Collateral denied safety concerns of pt being discharged in the event of psychiatrically cleared.    COVID Exposure Screen- collateral (i.e. third-party, chart review, belongings, etc; include EMS and ED staff)  1.        *Has the patient had a COVID-19 test in the last 90 days?  (  ) Yes   ( x ) No   (  ) Unknown- Reason: _____  IF YES PROCEED TO QUESTION #2. IF NO OR UNKNOWN, PLEASE SKIP TO QUESTION #3.  2.        Date of test(s) and result(s): ________  3.        *Has the patient tested positive for COVID-19 antibodies? (  ) Yes   ( x ) No   (  ) Unknown- Reason: _____  IF YES PRO  CEED TO QUESTION #4. IF NO or UNKNOWN, PLEASE SKIP TO QUESTION #5.  4.        Date of positive antibody test: ________  5.        *Has the patient received 2 doses of the COVID-19 vaccine? ( x ) Yes   (  ) No   (  ) Unknown- Reason: _____  IF YES PROCEED TO QUESTION #6. IF NO or UNKNOWN, PLEASE SKIP TO QUESTION #7.  6.         Date of second dose: Collateral unable to recall  7.        *In the past 10 days, has the patient been around anyone with a positive COVID-19 test?* (  ) Yes   ( x ) No   (  ) Unknown- Reason: __  IF YES PROCEED TO QUESTION #8. IF NO or UNKNOWN, PLEASE SKIP TO QUESTION #13.  8.        Was the patient within 6 feet of them for at least 15 minutes? (  ) Yes   (  ) No   (  ) Unknown- Reason: ____  9.        Did the patient provide care for them? (  ) Yes   (  ) No   (  ) Unknown- Reason: ______  10.     Did the patient have direct physical contact with them (touched, hugged, or kissed them)? (  ) Yes   (  ) No	(  ) Unknown- Reason: __  11.     Did the patient share eating or drinking utensils with them? (  ) Yes   (  ) No	(  ) Unknown- Reason: ____  12.     Did they sneeze, cough, or somehow get respiratory droplets on the patient? (  ) Yes   (  ) No	(  ) Unknown- Reason: ______  13.     *Has the patient been out of [place] within the past 10 days?* (  ) Yes   ( x ) No   (  ) Unknown- Reason: _____  IF YES PLEASE ANSWER THE FOLLOWING QUESTIONS:  14.     Which state/country did they go to? ______  15.     Were they there over 24 hours? (  ) Yes   (  ) No	(  ) Unknown- Reason: ______

## 2022-05-21 NOTE — ED BEHAVIORAL HEALTH ASSESSMENT NOTE - SAFETY PLAN ADDT'L DETAILS
Education provided regarding environmental safety / lethal means restriction/Provision of National Suicide Prevention Lifeline 5-500-548-TALK (3796)

## 2022-05-21 NOTE — ED BEHAVIORAL HEALTH ASSESSMENT NOTE - HPI (INCLUDE ILLNESS QUALITY, SEVERITY, DURATION, TIMING, CONTEXT, MODIFYING FACTORS, ASSOCIATED SIGNS AND SYMPTOMS)
67y/oM, , domiciled w/ wife, works as  w/ PMH of Afib, GERD and PPhx of ETOH use Do BIBems activated by substance facility for concern for AH. Patient denies auditory or visual hallucinations on interview and exam. He denies suicidal ideation, intent or plan. Sleep, energy are good and focus is improving. Patient recently spent 4 months at the [facility] for ETOH dependence in the context of bereavement following daughter's suicide in late 2021. Patient then relapsed and was admitted to [facility] two days ago, per his self report. Has been drinking 1/2 pint daily. Denies prior inpatient admission, current outpatient psychiatrist or therapist and would like to initiate care in the community. Denies hx of seizure, current issues with balance.

## 2024-11-09 ENCOUNTER — EMERGENCY (EMERGENCY)
Facility: HOSPITAL | Age: 69
LOS: 1 days | Discharge: HOME | End: 2024-11-09
Attending: EMERGENCY MEDICINE
Payer: COMMERCIAL

## 2024-11-09 VITALS
SYSTOLIC BLOOD PRESSURE: 124 MMHG | HEART RATE: 68 BPM | WEIGHT: 250 LBS | DIASTOLIC BLOOD PRESSURE: 80 MMHG | TEMPERATURE: 98 F | OXYGEN SATURATION: 98 % | RESPIRATION RATE: 18 BRPM | HEIGHT: 67 IN

## 2024-11-09 VITALS
OXYGEN SATURATION: 99 % | SYSTOLIC BLOOD PRESSURE: 129 MMHG | RESPIRATION RATE: 17 BRPM | HEART RATE: 76 BPM | DIASTOLIC BLOOD PRESSURE: 84 MMHG | TEMPERATURE: 98 F

## 2024-11-09 PROBLEM — F32.A DEPRESSION, UNSPECIFIED: Chronic | Status: ACTIVE | Noted: 2022-05-20

## 2024-11-09 PROCEDURE — 82962 GLUCOSE BLOOD TEST: CPT

## 2024-11-09 PROCEDURE — 72125 CT NECK SPINE W/O DYE: CPT | Mod: 26,MC

## 2024-11-09 PROCEDURE — 12011 RPR F/E/E/N/L/M 2.5 CM/<: CPT

## 2024-11-09 PROCEDURE — 90715 TDAP VACCINE 7 YRS/> IM: CPT

## 2024-11-09 PROCEDURE — 99284 EMERGENCY DEPT VISIT MOD MDM: CPT | Mod: 25

## 2024-11-09 PROCEDURE — 72125 CT NECK SPINE W/O DYE: CPT | Mod: MC

## 2024-11-09 PROCEDURE — 90471 IMMUNIZATION ADMIN: CPT

## 2024-11-09 PROCEDURE — 70450 CT HEAD/BRAIN W/O DYE: CPT | Mod: MC

## 2024-11-09 PROCEDURE — 99285 EMERGENCY DEPT VISIT HI MDM: CPT | Mod: 25

## 2024-11-09 PROCEDURE — 70450 CT HEAD/BRAIN W/O DYE: CPT | Mod: 26,MC

## 2024-11-09 RX ORDER — CLOSTRIDIUM TETANI TOXOID ANTIGEN (FORMALDEHYDE INACTIVATED), CORYNEBACTERIUM DIPHTHERIAE TOXOID ANTIGEN (FORMALDEHYDE INACTIVATED), BORDETELLA PERTUSSIS TOXOID ANTIGEN (GLUTARALDEHYDE INACTIVATED), BORDETELLA PERTUSSIS FILAMENTOUS HEMAGGLUTININ ANTIGEN (FORMALDEHYDE INACTIVATED), BORDETELLA PERTUSSIS PERTACTIN ANTIGEN, AND BORDETELLA PERTUSSIS FIMBRIAE 2/3 ANTIGEN 5; 2; 2.5; 5; 3; 5 [LF]/.5ML; [LF]/.5ML; UG/.5ML; UG/.5ML; UG/.5ML; UG/.5ML
0.5 INJECTION, SUSPENSION INTRAMUSCULAR ONCE
Refills: 0 | Status: COMPLETED | OUTPATIENT
Start: 2024-11-09 | End: 2024-11-09

## 2024-11-09 RX ADMIN — CLOSTRIDIUM TETANI TOXOID ANTIGEN (FORMALDEHYDE INACTIVATED), CORYNEBACTERIUM DIPHTHERIAE TOXOID ANTIGEN (FORMALDEHYDE INACTIVATED), BORDETELLA PERTUSSIS TOXOID ANTIGEN (GLUTARALDEHYDE INACTIVATED), BORDETELLA PERTUSSIS FILAMENTOUS HEMAGGLUTININ ANTIGEN (FORMALDEHYDE INACTIVATED), BORDETELLA PERTUSSIS PERTACTIN ANTIGEN, AND BORDETELLA PERTUSSIS FIMBRIAE 2/3 ANTIGEN 0.5 MILLILITER(S): 5; 2; 2.5; 5; 3; 5 INJECTION, SUSPENSION INTRAMUSCULAR at 08:05

## 2024-11-09 NOTE — ED ADULT TRIAGE NOTE - CHIEF COMPLAINT QUOTE
[facility]  reports that  got him from home ,INTOXICATED  with ALCOHOL .   Last drink 2 hours ago  TRI . [facility] EMS  reports that  got him from home ,INTOXICATED  with ALCOHOL .   Last drink 2 hours ago  VODKA . FS BS 92

## 2024-11-09 NOTE — ED PROVIDER NOTE - PATIENT PORTAL LINK FT
You can access the FollowMyHealth Patient Portal offered by [facility] by registering at the following website: [email or web address]. By joining [facility]’s FollowMyHealth portal, you will also be able to view your health information using other applications (apps) compatible with our system.

## 2024-11-09 NOTE — ED ADULT NURSE NOTE - NS ED NOTE ABUSE RESPONSE YN
[facility] [address] [facility]  Nursing Note  [phone]  Fax [phone]    Patient Name: Sohan Wagner  YOB: 1963    Post [facility] ED/1/27/17 2/1/17-Patient listed on nurse navigator combined daily census report  on 1/30/17. Patient seen in ED for neck pain. CT of head and negative for acute process. Spondylitic and post op changes noted. Further chart review shows patient has seen PCP at [facility] yesterday, 1/31/17. Per Dr Leni Gordon note:  Assessment/ Plan:   Malcom Kuhn was seen today for hospital follow up.     Diagnoses and all orders for this visit:     Neck pain  - prednisone (STERAPRED DS) 10 mg dose pack; 12 day DS taper pack as directed  -add Rx  -refer if not better  -cont baclofen    NN will monitor during post 30 day ED episode.
Yes

## 2024-11-09 NOTE — ED PROVIDER NOTE - NSFOLLOWUPCLINICS_GEN_ALL_ED_FT
[facility]  [address] [place] [address]  Phone: [phone]  Fax: [phone]

## 2024-11-09 NOTE — ED PROVIDER NOTE - OBJECTIVE STATEMENT
69-year-old male brought in by EMS for alcohol intoxication.  Patient sleeping in ED, responsive to verbal and tactile stimuli.  Patient noted to have linear superficial laceration to left forehead area.  Patient is oriented to himself, his date of birth and current year.  Patient does not recall how he injured himself.  Patient denies any chest pain or shortness of breath, no suicidal ideation.

## 2024-11-09 NOTE — ED PROVIDER NOTE - PHYSICAL EXAMINATION
GCS 15, no raccoon eyes, no Battles sign, no scalp step off deformities.  2 mm linear horizontal laceration to left forehead  No cervical, thoracic or lumbosacral midline bony deformities,  +rotation and flexion-extension of neck and truncal area intact.

## 2024-11-09 NOTE — ED ADULT NURSE NOTE - CHIEF COMPLAINT QUOTE
[facility]  reports that  got him from home ,INTOXICATED  with ALCOHOL .   Last drink 2 hours ago  VODKA . FS BS 92

## 2024-11-09 NOTE — ED PROVIDER NOTE - NSFOLLOWUPINSTRUCTIONS_ED_ALL_ED_FT
Do not drink alcohol in excess.  Return to ER if you have pain, fever, vomiting, feel depressed, anxious or unsafe. See contact information for detox facility. If you need any assistance for appointments please contact our Care Coordinator at [phone].     Do not put ointments to sterile glue it will fall off in 1 week

## 2024-11-09 NOTE — ED PROVIDER NOTE - CLINICAL SUMMARY MEDICAL DECISION MAKING FREE TEXT BOX
Pt intoxicated with forehead laceration. CT head and nexk ordered to r/o trauma.  CT HEAD reported: No acute intracranial hemorrhage, mass effect, or midline shift.   Calvarium intact.  CT CERVICAL SPINE reported :No acute fracture or traumatic subluxation.  750a- S.O to Dr. Murcia monitor for clinical sobriety. Pt intoxicated with forehead laceration. CT head and nexk ordered to r/o trauma.  CT HEAD reported: No acute intracranial hemorrhage, mass effect, or midline shift.   Calvarium intact.  CT CERVICAL SPINE reported :No acute fracture or traumatic subluxation.  8:39a Pt is clinically sober, Denies suicidal ideation, refused detox, denies being un-domiciled, refused  consult.   Pt is well appearing, has no new complaints and able to walk with normal gait. Pt is stable for discharge and follow up with medical doctor. Pt educated on care and need for follow up. Discussed anticipatory guidance and return precautions. Questions answered. I had a detailed discussion with the patient regarding the historical points, exam findings, and any diagnostic results supporting the discharge diagnosis.

## 2025-01-03 ENCOUNTER — EMERGENCY (EMERGENCY)
Facility: HOSPITAL | Age: 70
LOS: 1 days | Discharge: HOME | End: 2025-01-03
Attending: EMERGENCY MEDICINE
Payer: COMMERCIAL

## 2025-01-03 VITALS
HEART RATE: 64 BPM | RESPIRATION RATE: 20 BRPM | DIASTOLIC BLOOD PRESSURE: 50 MMHG | SYSTOLIC BLOOD PRESSURE: 64 MMHG | OXYGEN SATURATION: 98 % | HEIGHT: 67 IN

## 2025-01-03 LAB
ALBUMIN SERPL ELPH-MCNC: 3.4 G/DL — LOW (ref 3.5–5)
ALP SERPL-CCNC: 84 U/L — SIGNIFICANT CHANGE UP (ref 40–120)
ALT FLD-CCNC: 29 U/L DA — SIGNIFICANT CHANGE UP (ref 10–60)
ANION GAP SERPL CALC-SCNC: 7 MMOL/L — SIGNIFICANT CHANGE UP (ref 5–17)
APAP SERPL-MCNC: <2 UG/ML — LOW (ref 10–30)
AST SERPL-CCNC: 22 U/L — SIGNIFICANT CHANGE UP (ref 10–40)
BASOPHILS # BLD AUTO: 0.04 K/UL — SIGNIFICANT CHANGE UP (ref 0–0.2)
BASOPHILS NFR BLD AUTO: 0.6 % — SIGNIFICANT CHANGE UP (ref 0–2)
BILIRUB SERPL-MCNC: 0.3 MG/DL — SIGNIFICANT CHANGE UP (ref 0.2–1.2)
BUN SERPL-MCNC: 19 MG/DL — HIGH (ref 7–18)
CALCIUM SERPL-MCNC: 8.8 MG/DL — SIGNIFICANT CHANGE UP (ref 8.4–10.5)
CHLORIDE SERPL-SCNC: 103 MMOL/L — SIGNIFICANT CHANGE UP (ref 96–108)
CO2 SERPL-SCNC: 29 MMOL/L — SIGNIFICANT CHANGE UP (ref 22–31)
CREAT SERPL-MCNC: 0.97 MG/DL — SIGNIFICANT CHANGE UP (ref 0.5–1.3)
EGFR: 85 ML/MIN/1.73M2 — SIGNIFICANT CHANGE UP
EOSINOPHIL # BLD AUTO: 0.11 K/UL — SIGNIFICANT CHANGE UP (ref 0–0.5)
EOSINOPHIL NFR BLD AUTO: 1.8 % — SIGNIFICANT CHANGE UP (ref 0–6)
ETHANOL SERPL-MCNC: 341 MG/DL — HIGH (ref 0–10)
GLUCOSE SERPL-MCNC: 166 MG/DL — HIGH (ref 70–99)
HCT VFR BLD CALC: 38.5 % — LOW (ref 39–50)
HGB BLD-MCNC: 13.2 G/DL — SIGNIFICANT CHANGE UP (ref 13–17)
IMM GRANULOCYTES NFR BLD AUTO: 0.2 % — SIGNIFICANT CHANGE UP (ref 0–0.9)
LYMPHOCYTES # BLD AUTO: 1.63 K/UL — SIGNIFICANT CHANGE UP (ref 1–3.3)
LYMPHOCYTES # BLD AUTO: 26 % — SIGNIFICANT CHANGE UP (ref 13–44)
MCHC RBC-ENTMCNC: 32 PG — SIGNIFICANT CHANGE UP (ref 27–34)
MCHC RBC-ENTMCNC: 34.3 G/DL — SIGNIFICANT CHANGE UP (ref 32–36)
MCV RBC AUTO: 93.2 FL — SIGNIFICANT CHANGE UP (ref 80–100)
MONOCYTES # BLD AUTO: 0.55 K/UL — SIGNIFICANT CHANGE UP (ref 0–0.9)
MONOCYTES NFR BLD AUTO: 8.8 % — SIGNIFICANT CHANGE UP (ref 2–14)
NEUTROPHILS # BLD AUTO: 3.94 K/UL — SIGNIFICANT CHANGE UP (ref 1.8–7.4)
NEUTROPHILS NFR BLD AUTO: 62.6 % — SIGNIFICANT CHANGE UP (ref 43–77)
NRBC # BLD: 0 /100 WBCS — SIGNIFICANT CHANGE UP (ref 0–0)
PLATELET # BLD AUTO: 172 K/UL — SIGNIFICANT CHANGE UP (ref 150–400)
POTASSIUM SERPL-MCNC: 3.3 MMOL/L — LOW (ref 3.5–5.3)
POTASSIUM SERPL-SCNC: 3.3 MMOL/L — LOW (ref 3.5–5.3)
PROT SERPL-MCNC: 6.4 G/DL — SIGNIFICANT CHANGE UP (ref 6–8.3)
RBC # BLD: 4.13 M/UL — LOW (ref 4.2–5.8)
RBC # FLD: 13.2 % — SIGNIFICANT CHANGE UP (ref 10.3–14.5)
SALICYLATES SERPL-MCNC: <1.7 MG/DL — LOW (ref 2.8–20)
SODIUM SERPL-SCNC: 139 MMOL/L — SIGNIFICANT CHANGE UP (ref 135–145)
TROPONIN I, HIGH SENSITIVITY RESULT: 9.1 NG/L — SIGNIFICANT CHANGE UP
WBC # BLD: 6.28 K/UL — SIGNIFICANT CHANGE UP (ref 3.8–10.5)
WBC # FLD AUTO: 6.28 K/UL — SIGNIFICANT CHANGE UP (ref 3.8–10.5)

## 2025-01-03 PROCEDURE — 84484 ASSAY OF TROPONIN QUANT: CPT

## 2025-01-03 PROCEDURE — 36415 COLL VENOUS BLD VENIPUNCTURE: CPT

## 2025-01-03 PROCEDURE — 93005 ELECTROCARDIOGRAM TRACING: CPT

## 2025-01-03 PROCEDURE — 80053 COMPREHEN METABOLIC PANEL: CPT

## 2025-01-03 PROCEDURE — 93010 ELECTROCARDIOGRAM REPORT: CPT

## 2025-01-03 PROCEDURE — 80307 DRUG TEST PRSMV CHEM ANLYZR: CPT

## 2025-01-03 PROCEDURE — 82962 GLUCOSE BLOOD TEST: CPT

## 2025-01-03 PROCEDURE — 70450 CT HEAD/BRAIN W/O DYE: CPT | Mod: 26,MC

## 2025-01-03 PROCEDURE — 99285 EMERGENCY DEPT VISIT HI MDM: CPT

## 2025-01-03 PROCEDURE — 85025 COMPLETE CBC W/AUTO DIFF WBC: CPT

## 2025-01-03 PROCEDURE — 96374 THER/PROPH/DIAG INJ IV PUSH: CPT

## 2025-01-03 PROCEDURE — 70450 CT HEAD/BRAIN W/O DYE: CPT | Mod: MC

## 2025-01-03 PROCEDURE — 96375 TX/PRO/DX INJ NEW DRUG ADDON: CPT

## 2025-01-03 PROCEDURE — 99285 EMERGENCY DEPT VISIT HI MDM: CPT | Mod: 25

## 2025-01-03 RX ORDER — NALOXONE HCL 0.4 MG/ML
2 VIAL (ML) INJECTION ONCE
Refills: 0 | Status: COMPLETED | OUTPATIENT
Start: 2025-01-03 | End: 2025-01-03

## 2025-01-03 RX ORDER — SODIUM CHLORIDE 9 MG/ML
1000 INJECTION, SOLUTION INTRAMUSCULAR; INTRAVENOUS; SUBCUTANEOUS ONCE
Refills: 0 | Status: COMPLETED | OUTPATIENT
Start: 2025-01-03 | End: 2025-01-03

## 2025-01-03 RX ORDER — THIAMINE HYDROCHLORIDE 100 MG/ML
500 INJECTION, SOLUTION INTRAMUSCULAR; INTRAVENOUS ONCE
Refills: 0 | Status: COMPLETED | OUTPATIENT
Start: 2025-01-03 | End: 2025-01-03

## 2025-01-03 RX ORDER — MAGNESIUM SULFATE 500 MG/ML
1 INJECTION, SOLUTION INTRAMUSCULAR; INTRAVENOUS ONCE
Refills: 0 | Status: COMPLETED | OUTPATIENT
Start: 2025-01-03 | End: 2025-01-03

## 2025-01-03 RX ADMIN — MAGNESIUM SULFATE 100 GRAM(S): 500 INJECTION, SOLUTION INTRAMUSCULAR; INTRAVENOUS at 17:16

## 2025-01-03 RX ADMIN — SODIUM CHLORIDE 1000 MILLILITER(S): 9 INJECTION, SOLUTION INTRAMUSCULAR; INTRAVENOUS; SUBCUTANEOUS at 17:00

## 2025-01-03 RX ADMIN — THIAMINE HYDROCHLORIDE 105 MILLIGRAM(S): 100 INJECTION, SOLUTION INTRAMUSCULAR; INTRAVENOUS at 17:14

## 2025-01-03 NOTE — ED PROVIDER NOTE - PROGRESS NOTE
Physical Therapy       24 [id number]   PT  Visit   PT Received On 24   Response to Previous Treatment Patient reporting fatigue but able to participate.   General   Reason for Referral Impaired mobility   Referred By Dr Carlisle   Past Medical History Relevant to Rehab COPD, CAD s/p PCI 10/4/23, NSVT, HTN, HLD, rheumatoid arthritis, and hypothyroidism, CHF, Afib (not on AC), AAA, asthma, COPD, chronic respiratory failure 2/2 pulmonary sarcoidosis, Migraines, Hiatal Hernia, and Diverticulosis.   Patient Position Received Up in chair   Preferred Learning Style verbal;visual   General Comment pt agreeable to therapy   Precautions   Hearing/Visual Limitations hearing diminished; corective lenses for reading   Medical Precautions Fall precautions;Oxygen therapy device and L/min   Precautions Comment Oxygen tubing.   Pain Assessment   Pain Assessment 0-10   Pain Score 4   Pain Type Chronic pain   Pain Location Neck   Pain Orientation Left   Therapeutic Exercise   Therapeutic Exercise Performed Yes   Therapeutic Exercise Activity 3 seated LAQ, 1.5# BLE, 1 x 15 5 sec holds   Therapeutic Exercise Activity 5 seated marches, 1.5# BLE 1 x 15,   Balance/Neuromuscular Re-Education   Balance/Neuromuscular Re-Education Activity Performed Yes   Balance/Neuromuscular Re-Education Activity 1 TU sec, no device, O2 follow   Modalities   Modalities Used Yes   Modality 1   (soft tissue mobilization, neck extensors, upper trapezius, SCM)   Ambulation/Gait Training   Ambulation/Gait Training Performed Yes   Ambulation/Gait Training 1   Surface 1 Level tile;Carpet   Device 1 Rollator   Gait Support Devices Gait belt   Assistance 1 Independent   Quality of Gait 1 Forward flexed posture   Comments/Distance (ft) 1 150' turns included; walked 3 min 20 seconds before fatiguing and requiring a rest   Ambulation/Gait Training 2   Surface 2 Level tile   Device 2 No device   Assistance 2 Independent   Quality of Gait 2 Forward flexed  posture   Comments/Distance (ft) 2 10' x 2, turns included   Transfers   Transfer Yes   Transfer 1   Transfer From 1 Wheelchair to   Transfer to 1 Stand   Technique 1 Stand to sit;Sit to stand   Transfer Level of Assistance 1 Independent   Trials/Comments 1 no device   Activity Tolerance   Endurance Tolerates 10 - 20 min exercise with multiple rests   Early Mobility/Exercise Safety Screen Proceed with mobilization - No exclusion criteria met   PT Assessment   PT Assessment Results Decreased strength;Pain;Decreased range of motion;Decreased endurance;Impaired balance;Decreased mobility;Decreased cognition;Impaired judgement;Decreased safety awareness   Rehab Prognosis Excellent   Evaluation/Treatment Tolerance Patient limited by fatigue   Strengths Ability to acquire knowledge;Support of Caregivers   Barriers to Participation Comorbidities   End of Session Patient Position Up in chair   PT Plan   Inpatient/Swing Bed or Outpatient Inpatient   PT Plan   Treatment/Interventions Transfer training;Gait training;Therapeutic exercise;Therapeutic activity   PT Plan Skilled PT   PT Recommended Transfer Status Independent  (in room)   PT - OK to Discharge Yes        Stable.

## 2025-01-03 NOTE — ED PROVIDER NOTE - PROGRESS NOTE DETAILS
grady: Workup no acute findings.  Clinically in tox and alcohol level elevated pending sobriety. JK - Patient clinically sober at this time. Tolerating PO, ambulatory. VSS, aaox3. Well appearing. Ready for DC.

## 2025-01-03 NOTE — ED PROVIDER NOTE - CLINICAL SUMMARY MEDICAL DECISION MAKING FREE TEXT BOX
69 yr old male with hx of alcohol abuse and depression presents to ed via ems for ams pta. [facility] but not providing hx. ems states bp low and given 250cc. fs 116. no vomiting. no sign of trauma.     likely alcohol intox r/o OD vs opioid vs ich- labs, ekg, cth, narcan trial even though ems gave 2.5 ivp w/o response.

## 2025-01-03 NOTE — ED PROVIDER NOTE - OBJECTIVE STATEMENT
69 yr old male with hx of alcohol abuse and depression presents to ed via ems for ams pta. [facility] up but not providing hx. ems states bp low and given 250cc. fs 116. no vomiting. no sign of trauma.

## 2025-01-03 NOTE — ED PROVIDER NOTE - PATIENT PORTAL LINK FT
You can access the FollowMyHealth Patient Portal offered by  by registering at the following website: [email or web address]. By joining [employer]’s FollowMyHealth portal, you will also be able to view your health information using other applications (apps) compatible with our system.

## 2025-01-03 NOTE — ED PROVIDER NOTE - NSFOLLOWUPINSTRUCTIONS_ED_ALL_ED_FT
Alcohol Intoxication  Alcohol intoxication occurs when a person no longer thinks clearly or functions well after drinking alcohol. This is also referred to as becoming impaired. Intoxication can occur with just one drink. The legal definition of alcohol intoxication depends on the amount of alcohol in the blood (blood alcohol concentration, STAN). STAN of 80–100 mg/dL or higher is commonly considered legally intoxicated. The level of impairment depends on:  The amount of alcohol the person had.  The person's age, gender, and weight.  How often the person drinks.  Whether the person has other medical conditions, such as diabetes, seizures, or a heart condition.  Alcohol intoxication can range from mild to severe. The condition can be dangerous, especially if the person:  Also took certain drugs or prescription medicines.  Drinks a large amount of alcohol in a short period of time (binge drinks).  For women, binge drinking is having four or more drinks at one time.  For men, binge drinking is having five or more drinks at one time.  If you or anyone around you appears intoxicated, speak up and act.    What are the causes?  This condition is caused by drinking alcohol.    What increases the risk?  The following factors may make you more likely to develop this condition:  Peer pressure in young adults.  Difficulty managing stress.  History of drug or alcohol abuse.  Family history of drug or alcohol abuse.  Combining alcohol with drugs.  Low body weight.  Binge drinking.  What are the signs or symptoms?  Symptoms of alcohol intoxication can vary from person to person. Symptoms can be mild, moderate, or severe.    Symptoms of mild alcohol intoxication may include:  Feeling relaxed or sleepy.  Having mild difficulty with coordination, speech, memory, or attention.  Symptoms of moderate alcohol intoxication may include:  Strong anger or extreme sadness.  Moderate difficulty with coordination, speech, memory, or attention.  Symptoms of severe alcohol intoxication may include:  Severe difficulty with coordination, speech, memory, or attention.  Passing out.  Vomiting.  Confusion.  Slow breathing.  Coma.  Intoxication can change quickly from mild to severe. It can cause coma or death, especially in people who do not drink alcohol often.    How is this diagnosed?  Your health care provider will ask you how much alcohol you drank and what kind you had. Intoxication may also be diagnosed based on:  Your symptoms and medical history.  A physical exam.  A blood test that measures STAN.  A smell of alcohol on your breath.  How is this treated?  Treatment for alcohol intoxication may include:  Being monitored in an emergency department, hospital, or treatment center until your STAN comes down and it is safe for you to go home.  IV fluids to prevent or treat loss of fluid in the body (dehydration).  Medicine to treat nausea or vomiting or to get rid of alcohol in the body.  Counseling about the dangers of using alcohol.  Treatment for substance use disorder.  Oxygen therapy or a breathing machine (ventilator).  Drinking alcohol for a long time can have long-term effects on your brain, heart, and digestive system. These effects can be serious and may also require treatment.    Follow these instructions at home:  A sign showing that a person should not drive.  Eating and drinking    A 12-ounce bottle of beer, a 5-ounce glass of wine, and a 1.5-ounce shot of hard liquor.  Do not drink alcohol if:  Your health care provider tells you not to drink.  You are pregnant, may be pregnant, or are planning to become pregnant.  You are under the legal drinking age, or under 21 years old in the [place]  You are taking medicines that should not be taken with alcohol.  You have a medical condition, and alcohol makes it worse.  You need to drive or perform activities that require you to be alert.  You have substance use disorder.  Ask your health care provider if alcohol is safe for you. If your health care provider allows you to drink alcohol, limit how much you have to:  0–1 drink a day for women who are not pregnant.  0–2 drinks a day for men.  Know how much alcohol is in your drink. In the [place] one drink equals one 12 oz bottle of beer (355 mL), one 5 oz glass of wine (148 mL), or one 1½ oz glass of hard liquor (44 mL).  Avoid drinking alcohol on an empty stomach.  Alcohol increases urination. It is important to stay hydrated and avoid caffeine.  Avoid drinking more than one drink per hour.  When having multiple drinks, drink water or a non-alcoholic beverage between alcoholic drinks.  General instructions    Take over-the-counter and prescription medicines only as told by your health care provider.  Do not drive after drinking any amount of alcohol. Plan for a designated  or another way to go home.  Have someone responsible stay with you while you are intoxicated. You should notbe left alone.  Contact a health care provider if:  You do not feel better after a few days.  You have problems at work, at school, or at home due to drinking.  Get help right away if:  You have any of the following:  Trouble staying awake.  Moderate to severe trouble with coordination, speech, memory, or attention.  You are told you may have had a seizure.  Vomiting bright red blood or material that looks like coffee grounds.  Bloody stool (feces). The blood may make your stool bright red, black, or tarry.  These symptoms may be an emergency. Get help right away. Call 911.  Do not wait to see if the symptoms will go away.  Do not drive yourself to the hospital.  Also, get help right away if:  You have thoughts about hurting yourself or others.  Take one of these steps if you feel like you may hurt yourself or others, or have thoughts about taking your own life:  Call 911.  Call the National Suicide Prevention Lifeline at 1-301.878.5870 or 434. This is open 24 hours a day.  Text the Crisis Text Line at 521467.  Summary  Alcohol intoxication occurs when a person no longer thinks clearly or functions well after drinking alcohol.  Ask your health care provider if alcohol is safe for you. If your health care provider allows you to drink alcohol, limit how much you have.  Contact your health care provider if drinking has caused you problems at work, school, or home.  Get help right away if you have thoughts about hurting yourself or others.  This information is not intended to replace advice given to you by your health care provider. Make sure you discuss any questions you have with your health care provider.

## 2025-01-03 NOTE — ED ADULT NURSE NOTE - NSFALLRISKINTERV_ED_ALL_ED
Assistance OOB with selected safe patient handling equipment if applicable/Assistance with ambulation/Communicate fall risk and risk factors to all staff, patient, and family/Monitor gait and stability/Monitor for mental status changes and reorient to person, place, and time, as needed/Provide visual cue: yellow wristband, yellow gown, etc/Reinforce activity limits and safety measures with patient and family/Toileting schedule using arm’s reach rule for commode and bathroom/Use of alarms - bed, stretcher, chair and/or video monitoring/Call bell, personal items and telephone in reach/Instruct patient to call for assistance before getting out of bed/chair/stretcher/Non-slip footwear applied when patient is off stretcher/[place] to call system/Physically safe environment - no spills, clutter or unnecessary equipment/Purposeful Proactive Rounding/Room/bathroom lighting operational, light cord in reach

## 2025-01-03 NOTE — ED ADULT NURSE NOTE - OBJECTIVE STATEMENT
BIBA, Patient was found at [facility], responsive to painful stimulus, received Narcan 2.5 mg by IV by EMS. Patient is brought in as walk in notification. Patient is  sleepy and saturation is at 96% at Room air. MD Cueva bedside.

## 2025-01-04 VITALS
SYSTOLIC BLOOD PRESSURE: 142 MMHG | OXYGEN SATURATION: 95 % | HEART RATE: 82 BPM | TEMPERATURE: 98 F | DIASTOLIC BLOOD PRESSURE: 99 MMHG | RESPIRATION RATE: 18 BRPM

## 2025-01-04 NOTE — ED ADULT NURSE REASSESSMENT NOTE - NS ED NURSE REASSESS COMMENT FT1
Pt wife came to ED to see pt. Pt states he cant remember being intox. As per wife pt drinks everyday . Pt AxOx3
